# Patient Record
Sex: FEMALE | URBAN - METROPOLITAN AREA
[De-identification: names, ages, dates, MRNs, and addresses within clinical notes are randomized per-mention and may not be internally consistent; named-entity substitution may affect disease eponyms.]

---

## 2022-01-14 ENCOUNTER — OFFICE VISIT (OUTPATIENT)
Dept: URBAN - METROPOLITAN AREA CLINIC 33 | Facility: CLINIC | Age: 22
End: 2022-01-14

## 2022-01-14 NOTE — HPI-BLOOD IN STOOL
20 y/o female patient presents today for a consultation about blood in stool, (which has occured on ( 2021) // which was detected through a recent fecal occult blood test/positive cologuard on (/2021)).  Symptoms started __ ago and happens -- times a week.  Patient denies any associated symptoms of mucus, melena, pruritus ani, rectal pain, abdominal cramping/pain, bloating, gas, or  heartburn.   The blood is __ in color and is present on tissue and within the toilet bowl, enough to stain the water red.  Patient currently admits __ bowel movements per __.   She admits blood, mucus, or melena in stools. Patient admits experiencing fecal urgency but unable to have a bowel movement.    Patient denies any associated symptoms of abdominal cramping/pain, bloating, gas, or  heartburn. Patient admits/denies aggravating factors as ---- and/or alleviating factors as ---- . Patient admits to taking --- medications with --- relief of symptoms.  Patient admits having a colonoscopy/EGD in --- by  ---- with --  findings. Patient denies family history of colonic cancer/disease/polyps.

## 2022-01-28 ENCOUNTER — TELEPHONE ENCOUNTER (OUTPATIENT)
Dept: URBAN - METROPOLITAN AREA CLINIC 33 | Facility: CLINIC | Age: 22
End: 2022-01-28

## 2022-01-28 ENCOUNTER — OFFICE VISIT (OUTPATIENT)
Dept: URBAN - METROPOLITAN AREA CLINIC 33 | Facility: CLINIC | Age: 22
End: 2022-01-28

## 2022-01-28 NOTE — HPI-BLOOD IN STOOL
22 y/o female patient presents today for a consultation about blood in stool, (which has occured on ( 2021) // which was detected through a recent fecal occult blood test/positive cologuard on (/2021)).  Symptoms started __ ago and happens -- times a week.  Patient denies any associated symptoms of mucus, melena, pruritus ani, rectal pain, abdominal cramping/pain, bloating, gas, or  heartburn.   The blood is __ in color and is present on tissue and within the toilet bowl, enough to stain the water red.  Patient currently admits __ bowel movements per __.   She admits blood, mucus, or melena in stools. Patient admits experiencing fecal urgency but unable to have a bowel movement.    Patient denies any associated symptoms of abdominal cramping/pain, bloating, gas, or  heartburn. Patient admits/denies aggravating factors as ---- and/or alleviating factors as ---- . Patient admits to taking --- medications with --- relief of symptoms.  Patient admits having a colonoscopy/EGD in --- by  ---- with --  findings. Patient denies family history of colonic cancer/disease/polyps.

## 2022-09-23 ENCOUNTER — OFFICE VISIT (OUTPATIENT)
Dept: URBAN - METROPOLITAN AREA CLINIC 78 | Facility: CLINIC | Age: 22
End: 2022-09-23